# Patient Record
Sex: FEMALE | Race: OTHER | NOT HISPANIC OR LATINO | URBAN - METROPOLITAN AREA
[De-identification: names, ages, dates, MRNs, and addresses within clinical notes are randomized per-mention and may not be internally consistent; named-entity substitution may affect disease eponyms.]

---

## 2023-11-17 DIAGNOSIS — E03.9 HYPOTHYROIDISM, UNSPECIFIED: ICD-10-CM

## 2023-11-17 RX ORDER — LEVOTHYROXINE SODIUM 50 UG/1
50 TABLET ORAL
Qty: 30 TABLET | Refills: 0 | Status: SHIPPED | OUTPATIENT
Start: 2023-11-17 | End: 2023-12-21

## 2023-12-21 DIAGNOSIS — E03.9 HYPOTHYROIDISM, UNSPECIFIED: ICD-10-CM

## 2023-12-21 RX ORDER — LEVOTHYROXINE SODIUM 50 UG/1
50 TABLET ORAL
Qty: 90 TABLET | Refills: 0 | Status: SHIPPED | OUTPATIENT
Start: 2023-12-21 | End: 2024-04-24

## 2024-01-24 NOTE — PROGRESS NOTES
"This is a 23 year old female for ROUTINE MEDICAL and has Hx MIGRAINES a bit frequent recently but not severe regular pattern; with SCINTILLATING SCOTOMA and NAUSEA.    TRIGGERS are reviewed and minimal ETOH and will DC  SLEEP CYCLE  STRESS  CHOCOLATES    NOT ON OC or other hormones and will avoid due to MIGRAINES   NOT YET SEXUALLY ACTIVE      ROS is NEG for HEADACHE, NAUSEA, VOMITING, DIARRHEA, CHEST PAIN, SOB, and BLEEDING and as further REVIEWED BELOW.    Subjective   Neymar Armendariz is a 23 y.o. female who presents for ROUTINE MEDICAL and EVAL of MOR FREQ MIGRAINES since LATE 2023 COVID EPISODE   .    HPI:    See above    Review of systems is essentially negative for all systems except for any identified issues in HPI above.    Objective     /70   Pulse 70   Temp 36.3 °C (97.4 °F)   Ht 1.676 m (5' 6\")   Wt 84.8 kg (187 lb)   SpO2 100%   BMI 30.18 kg/m²      COMPLETE PHYSICAL EXAM    GENERAL           General Appearance: pleasant, well-appearing, well-developed, well-hydrated, well-nourished, well-groomed, .        HEENT           NECK supple, Neg for adneopathy no thyroid enlargement or nodules, Oropharynx normal no exudates.        EYES           Pupils: PERRLA, no photophobia.        HEART           Rate and Rhythm regular rate and rhythm. Heart sounds: normal S1S2. Murmurs: none.        LUNGS           Effort: Normal chest wall, no pectus, Normal air entry all fields, Clear to IPPA, RR<16 with no use of accessory muscles.        BREASTS           Bilaterally: no masses, no nipple retraction, no nipple discharge, no abnormal skin changes. Axilla: no lymphadenopathy.        BACK           General: unremarkable, no spinal tenderness or rashes.        ABDOMEN           General: Normal to inspection, neg for LKKS or masses and BSs heard in all quadrants               LYMPHATICS           Cervical: none. Axillary: none.        MUSCULOSKELETAL           gross abnormalities no gross abnormalities, no " joint redness or swelling.        EXTREMITIES           Varicose veins: not present. Pulses: 2+ bilateral. Clubbing: none. Cyanosis: no.        NEUROLOGICAL           Orientation: alert and oriented x 3. Grossly normal: yes. Plantars: downgoing bilaterally. Muscle Bulk: normal . Cranial Nerves: CN's II-XII grossly intact.        PSYCHOLOGY           Affect: appropriate. Mood: pleasant.     Assessment/Plan   Problem List Items Addressed This Visit    None  Visit Diagnoses       Routine medical exam    -  Primary    Immunization counseling                FOLLOW UP:   YEARLY for ROUTINE MEDICAL and PRN for other issues as discussed in visit         Vita Martinez M.D.

## 2024-02-06 ENCOUNTER — LAB (OUTPATIENT)
Dept: LAB | Facility: LAB | Age: 24
End: 2024-02-06
Payer: COMMERCIAL

## 2024-02-06 ENCOUNTER — OFFICE VISIT (OUTPATIENT)
Dept: PRIMARY CARE | Facility: CLINIC | Age: 24
End: 2024-02-06
Payer: COMMERCIAL

## 2024-02-06 VITALS
TEMPERATURE: 97.4 F | SYSTOLIC BLOOD PRESSURE: 110 MMHG | BODY MASS INDEX: 30.05 KG/M2 | WEIGHT: 187 LBS | HEART RATE: 70 BPM | OXYGEN SATURATION: 100 % | HEIGHT: 66 IN | DIASTOLIC BLOOD PRESSURE: 70 MMHG

## 2024-02-06 DIAGNOSIS — Z71.85 IMMUNIZATION COUNSELING: ICD-10-CM

## 2024-02-06 DIAGNOSIS — E03.9 HYPOTHYROIDISM, UNSPECIFIED TYPE: ICD-10-CM

## 2024-02-06 DIAGNOSIS — G43.109 MIGRAINE WITH AURA AND WITHOUT STATUS MIGRAINOSUS, NOT INTRACTABLE: ICD-10-CM

## 2024-02-06 DIAGNOSIS — Z00.00 ROUTINE MEDICAL EXAM: Primary | ICD-10-CM

## 2024-02-06 DIAGNOSIS — J45.909 UNCOMPLICATED ASTHMA, UNSPECIFIED ASTHMA SEVERITY, UNSPECIFIED WHETHER PERSISTENT (HHS-HCC): ICD-10-CM

## 2024-02-06 DIAGNOSIS — Z00.00 ROUTINE MEDICAL EXAM: ICD-10-CM

## 2024-02-06 DIAGNOSIS — Z76.0 MEDICATION REFILL: ICD-10-CM

## 2024-02-06 LAB
ALBUMIN SERPL-MCNC: 4.4 G/DL (ref 3.5–5)
ALP BLD-CCNC: 81 U/L (ref 35–125)
ALT SERPL-CCNC: 11 U/L (ref 5–40)
ANION GAP SERPL CALC-SCNC: 11 MMOL/L
AST SERPL-CCNC: 17 U/L (ref 5–40)
BACTERIA #/AREA URNS AUTO: ABNORMAL /HPF
BASOPHILS # BLD AUTO: 0.06 X10*3/UL (ref 0–0.1)
BASOPHILS NFR BLD AUTO: 0.7 %
BILIRUB SERPL-MCNC: 0.3 MG/DL (ref 0.1–1.2)
BUN SERPL-MCNC: 7 MG/DL (ref 8–25)
CALCIUM SERPL-MCNC: 9.2 MG/DL (ref 8.5–10.4)
CHLORIDE SERPL-SCNC: 104 MMOL/L (ref 97–107)
CO2 SERPL-SCNC: 25 MMOL/L (ref 24–31)
CREAT SERPL-MCNC: 0.8 MG/DL (ref 0.4–1.6)
EGFRCR SERPLBLD CKD-EPI 2021: >90 ML/MIN/1.73M*2
EOSINOPHIL # BLD AUTO: 0.11 X10*3/UL (ref 0–0.7)
EOSINOPHIL NFR BLD AUTO: 1.4 %
ERYTHROCYTE [DISTWIDTH] IN BLOOD BY AUTOMATED COUNT: 12.2 % (ref 11.5–14.5)
GLUCOSE SERPL-MCNC: 84 MG/DL (ref 65–99)
HCT VFR BLD AUTO: 42.1 % (ref 36–46)
HGB BLD-MCNC: 14.4 G/DL (ref 12–16)
IMM GRANULOCYTES # BLD AUTO: 0.03 X10*3/UL (ref 0–0.7)
IMM GRANULOCYTES NFR BLD AUTO: 0.4 % (ref 0–0.9)
LYMPHOCYTES # BLD AUTO: 1.87 X10*3/UL (ref 1.2–4.8)
LYMPHOCYTES NFR BLD AUTO: 23.2 %
MCH RBC QN AUTO: 30.4 PG (ref 26–34)
MCHC RBC AUTO-ENTMCNC: 34.2 G/DL (ref 32–36)
MCV RBC AUTO: 89 FL (ref 80–100)
MONOCYTES # BLD AUTO: 0.57 X10*3/UL (ref 0.1–1)
MONOCYTES NFR BLD AUTO: 7.1 %
MUCOUS THREADS #/AREA URNS AUTO: ABNORMAL /LPF
NEUTROPHILS # BLD AUTO: 5.42 X10*3/UL (ref 1.2–7.7)
NEUTROPHILS NFR BLD AUTO: 67.2 %
NRBC BLD-RTO: 0 /100 WBCS (ref 0–0)
PLATELET # BLD AUTO: 318 X10*3/UL (ref 150–450)
POTASSIUM SERPL-SCNC: 4.3 MMOL/L (ref 3.4–5.1)
PROT SERPL-MCNC: 7.1 G/DL (ref 5.9–7.9)
RBC # BLD AUTO: 4.74 X10*6/UL (ref 4–5.2)
RBC #/AREA URNS AUTO: ABNORMAL /HPF
SODIUM SERPL-SCNC: 140 MMOL/L (ref 133–145)
SQUAMOUS #/AREA URNS AUTO: ABNORMAL /HPF
TSH SERPL DL<=0.05 MIU/L-ACNC: 1.56 MIU/L (ref 0.27–4.2)
WBC # BLD AUTO: 8.1 X10*3/UL (ref 4.4–11.3)
WBC #/AREA URNS AUTO: ABNORMAL /HPF

## 2024-02-06 PROCEDURE — 1036F TOBACCO NON-USER: CPT | Performed by: FAMILY MEDICINE

## 2024-02-06 PROCEDURE — 85025 COMPLETE CBC W/AUTO DIFF WBC: CPT

## 2024-02-06 PROCEDURE — 80053 COMPREHEN METABOLIC PANEL: CPT

## 2024-02-06 PROCEDURE — 36415 COLL VENOUS BLD VENIPUNCTURE: CPT

## 2024-02-06 PROCEDURE — 81001 URINALYSIS AUTO W/SCOPE: CPT

## 2024-02-06 PROCEDURE — 84443 ASSAY THYROID STIM HORMONE: CPT

## 2024-02-06 PROCEDURE — 99395 PREV VISIT EST AGE 18-39: CPT | Performed by: FAMILY MEDICINE

## 2024-02-06 RX ORDER — ALBUTEROL SULFATE 90 UG/1
2 AEROSOL, METERED RESPIRATORY (INHALATION) EVERY 4 HOURS PRN
Qty: 18 G | Refills: 11 | Status: SHIPPED | OUTPATIENT
Start: 2024-02-06 | End: 2024-03-07

## 2024-02-06 RX ORDER — ALBUTEROL SULFATE 90 UG/1
AEROSOL, METERED RESPIRATORY (INHALATION)
COMMUNITY
Start: 2022-08-09 | End: 2024-02-06 | Stop reason: SDUPTHER

## 2024-02-06 RX ORDER — SUMATRIPTAN 50 MG/1
50 TABLET, FILM COATED ORAL ONCE AS NEEDED
Qty: 27 TABLET | Refills: 3 | Status: SHIPPED | OUTPATIENT
Start: 2024-02-06 | End: 2025-02-05

## 2024-02-06 RX ORDER — FLUTICASONE PROPIONATE AND SALMETEROL 500; 50 UG/1; UG/1
POWDER RESPIRATORY (INHALATION)
COMMUNITY
Start: 2022-08-09 | End: 2024-02-06 | Stop reason: SDUPTHER

## 2024-02-06 RX ORDER — FLUTICASONE PROPIONATE AND SALMETEROL 500; 50 UG/1; UG/1
1 POWDER RESPIRATORY (INHALATION)
Qty: 1 EACH | Refills: 11 | Status: SHIPPED | OUTPATIENT
Start: 2024-02-06 | End: 2024-03-07

## 2024-02-06 ASSESSMENT — PAIN SCALES - GENERAL: PAINLEVEL: 0-NO PAIN

## 2024-02-06 ASSESSMENT — PATIENT HEALTH QUESTIONNAIRE - PHQ9
2. FEELING DOWN, DEPRESSED OR HOPELESS: NOT AT ALL
1. LITTLE INTEREST OR PLEASURE IN DOING THINGS: NOT AT ALL
SUM OF ALL RESPONSES TO PHQ9 QUESTIONS 1 AND 2: 0

## 2024-04-24 DIAGNOSIS — E03.9 HYPOTHYROIDISM, UNSPECIFIED: ICD-10-CM

## 2024-04-24 RX ORDER — LEVOTHYROXINE SODIUM 50 UG/1
50 TABLET ORAL
Qty: 90 TABLET | Refills: 3 | Status: SHIPPED | OUTPATIENT
Start: 2024-04-24 | End: 2025-04-19

## 2024-04-24 NOTE — TELEPHONE ENCOUNTER
Recent Visits  Date Type Provider Dept   02/06/24 Office Visit Vita Amaya MD [place] [id number]   Showing recent visits within past 365 days and meeting all other requirements  Future Appointments  No visits were found meeting these conditions.  Showing future appointments within next 90 days and meeting all other requirements    Last ov 2/6/24

## 2025-02-13 ENCOUNTER — OFFICE VISIT (OUTPATIENT)
Dept: URGENT CARE | Age: 25
End: 2025-02-13
Payer: COMMERCIAL

## 2025-02-13 VITALS
BODY MASS INDEX: 27.32 KG/M2 | DIASTOLIC BLOOD PRESSURE: 96 MMHG | OXYGEN SATURATION: 99 % | HEIGHT: 66 IN | RESPIRATION RATE: 18 BRPM | SYSTOLIC BLOOD PRESSURE: 136 MMHG | WEIGHT: 170 LBS | HEART RATE: 100 BPM | TEMPERATURE: 98.4 F

## 2025-02-13 DIAGNOSIS — J02.9 SORE THROAT: ICD-10-CM

## 2025-02-13 DIAGNOSIS — R05.1 ACUTE COUGH: ICD-10-CM

## 2025-02-13 DIAGNOSIS — J06.9 VIRAL UPPER RESPIRATORY TRACT INFECTION: Primary | ICD-10-CM

## 2025-02-13 LAB
POC BINAX EXPIRATION: NORMAL
POC BINAX NOW COVID SERIAL NUMBER: NORMAL
POC RAPID INFLUENZA A: NEGATIVE
POC RAPID INFLUENZA B: NEGATIVE
POC RAPID STREP: NEGATIVE
POC SARS-COV-2 AG BINAX: NORMAL

## 2025-02-13 PROCEDURE — 3008F BODY MASS INDEX DOCD: CPT | Performed by: STUDENT IN AN ORGANIZED HEALTH CARE EDUCATION/TRAINING PROGRAM

## 2025-02-13 PROCEDURE — 87811 SARS-COV-2 COVID19 W/OPTIC: CPT | Performed by: STUDENT IN AN ORGANIZED HEALTH CARE EDUCATION/TRAINING PROGRAM

## 2025-02-13 PROCEDURE — 1036F TOBACCO NON-USER: CPT | Performed by: STUDENT IN AN ORGANIZED HEALTH CARE EDUCATION/TRAINING PROGRAM

## 2025-02-13 PROCEDURE — 99213 OFFICE O/P EST LOW 20 MIN: CPT | Performed by: STUDENT IN AN ORGANIZED HEALTH CARE EDUCATION/TRAINING PROGRAM

## 2025-02-13 PROCEDURE — 87880 STREP A ASSAY W/OPTIC: CPT | Performed by: STUDENT IN AN ORGANIZED HEALTH CARE EDUCATION/TRAINING PROGRAM

## 2025-02-13 PROCEDURE — 87804 INFLUENZA ASSAY W/OPTIC: CPT | Performed by: STUDENT IN AN ORGANIZED HEALTH CARE EDUCATION/TRAINING PROGRAM

## 2025-02-13 RX ORDER — FLUTICASONE PROPIONATE 50 MCG
1 SPRAY, SUSPENSION (ML) NASAL DAILY
Qty: 16 G | Refills: 0 | Status: SHIPPED | OUTPATIENT
Start: 2025-02-13 | End: 2025-02-27

## 2025-02-13 RX ORDER — BENZONATATE 200 MG/1
200 CAPSULE ORAL 3 TIMES DAILY PRN
Qty: 30 CAPSULE | Refills: 0 | Status: SHIPPED | OUTPATIENT
Start: 2025-02-13 | End: 2025-02-20

## 2025-02-13 RX ORDER — ALBUTEROL SULFATE 90 UG/1
2 INHALANT RESPIRATORY (INHALATION) EVERY 6 HOURS PRN
Qty: 18 G | Refills: 0 | Status: SHIPPED | OUTPATIENT
Start: 2025-02-13 | End: 2025-03-10

## 2025-02-13 ASSESSMENT — PAIN SCALES - GENERAL: PAINLEVEL_OUTOF10: 0-NO PAIN

## 2025-02-13 NOTE — LETTER
February 13, 2025     Patient: Neymar Armendariz   YOB: 2000   Date of Visit: 2/13/2025       To Whom It May Concern:    It is my medical opinion that Neymar Armendariz may return to work on 02/17/2025 .    If you have any questions or concerns, please don't hesitate to call.         Sincerely,        Gregory Haynes DO    CC: No Recipients

## 2025-02-13 NOTE — PROGRESS NOTES
"Subjective   Patient ID: Neymar Armendariz is a 24 y.o. female. They present today with a chief complaint of Cough (Coughing sore throat fever runny nose and vomiting since monday).    History of Present Illness  Neymar is a pleasant 24-year-old female who presents accompanied by parent for evaluation of cough, sore throat and chills since Monday, 2/10/2025.  Patient denies chest pain or shortness of breath.  Patient notes feeling nauseous and not exactly vomiting yesterday after having a cracker and no vomiting episodes since.  Patient denies severe abdominal pain or diarrhea.  Patient notes chills with cough that is worse at night.  Patient's main concern is a cough that is minimally productive of phlegm sputum.  Patient also reports some sinus congestion and postnasal drip.  Patient is seeking a work excuse.  Patient has been utilizing DuoNebs at night with some improvement of symptoms.  No other symptoms or concerns otherwise reported.    Past Medical History  Allergies as of 02/13/2025    (No Known Allergies)       (Not in a hospital admission)       Past Medical History:   Diagnosis Date    Asthma        Past Surgical History:   Procedure Laterality Date    TONSILLECTOMY          reports that she has never smoked. She has never used smokeless tobacco. She reports current alcohol use.    Review of Systems  A 10-point review of systems was performed, otherwise unremarkable unless stated in the history of present illness.                Objective    Vitals:    02/13/25 1713   BP: (!) 136/96   Pulse: 100   Resp: 18   Temp: 36.9 °C (98.4 °F)   TempSrc: Oral   SpO2: 99%   Weight: 77.1 kg (170 lb)   Height: 1.676 m (5' 6\")     No LMP recorded.    Gen: Vitals noted and reviewed, no evidence of acute distress, well developed and afebrile.   Psych: Mood and affect appropriate for setting.  Head/Face: Atraumatic and normocephalic.   Neuro: No focal deficits noted.  ENT: TMs clear bilaterally, EACs unremarkable. Mastoids " non-tender. Posterior oropharynx with erythema, without exudate, or swelling. Uvula is in the midline and non-edematous.   Neck: Supple. No meningismus through full range of motion. No lymphadenopathy.   Cardiac: Regular rate and rhythm no murmur.   Lungs: Clear to auscultation throughout, No evidence of wheezing, rhonchi or crackles. Good aeration throughout.   Abdomen: Soft non-tender throughout. Normoactive bowel sounds. No evidence of palpable masses    Extremities: Symmetrical, No peripheral edema  Skin: Without evidence of ecchymosis, wounds, or rashes.      Point of Care Test & Imaging Results from this visit  Results for orders placed or performed in visit on 02/13/25   POCT Covid-19 Rapid Antigen   Result Value Ref Range    Binax NOW Covid Serial Number -     BINAX NOW Covid Expiration -     POC MODESTA-COV-2 AG  Presumptive negative test for SARS-CoV-2 (no antigen detected)     Presumptive negative test for SARS-CoV-2 (no antigen detected)   POCT Influenza A/B manually resulted   Result Value Ref Range    POC Rapid Influenza A Negative Negative    POC Rapid Influenza B Negative Negative   POCT rapid strep A manually resulted   Result Value Ref Range    POC Rapid Strep Negative Negative      No results found.    Diagnostic study results (if any) were reviewed by Gregory Haynes DO.    Assessment/Plan   Allergies, medications, history, and pertinent labs/EKGs/Imaging reviewed by Gregory Haynes DO.     Medical Decision Making  Discussed with the patient symptoms and clinical presentation findings suggestive of an acute viral upper respiratory illness with cough of unclear etiology.  We advise close symptom monitoring supportive treatment use of over-the-counter remedies for added symptom relief.  We agreed to prescribe Tessalon Perles along with fluticasone nasal spray to aid in symptom management.  We advised continued use of home remedies and already prescribed DuoNeb nebulizer solution. Follow up with PCP. We  advised seeking immediate emergency medical attention if symptoms fail to improve, worsen or any concerning symptoms arise. Patient voiced full understanding and agreement to plan.      Orders and Diagnoses  Diagnoses and all orders for this visit:  Viral upper respiratory tract infection  -     benzonatate (Tessalon) 200 mg capsule; Take 1 capsule (200 mg) by mouth 3 times a day as needed for cough for up to 7 days. Do not crush or chew.  -     fluticasone (Flonase) 50 mcg/actuation nasal spray; Administer 1 spray into each nostril once daily for 14 days. Shake gently. Before first use, prime pump. After use, clean tip and replace cap. As needed for sinus congestion  -     albuterol 90 mcg/actuation inhaler; Inhale 2 puffs every 6 hours if needed for wheezing for up to 25 days.  Sore throat  -     POCT Covid-19 Rapid Antigen  -     POCT Influenza A/B manually resulted  -     POCT rapid strep A manually resulted  Acute cough  -     benzonatate (Tessalon) 200 mg capsule; Take 1 capsule (200 mg) by mouth 3 times a day as needed for cough for up to 7 days. Do not crush or chew.  -     fluticasone (Flonase) 50 mcg/actuation nasal spray; Administer 1 spray into each nostril once daily for 14 days. Shake gently. Before first use, prime pump. After use, clean tip and replace cap. As needed for sinus congestion  -     albuterol 90 mcg/actuation inhaler; Inhale 2 puffs every 6 hours if needed for wheezing for up to 25 days.      Medical Admin Record      Patient disposition: Home    Electronically signed by Gregory Haynes DO  5:59 PM

## 2025-07-07 DIAGNOSIS — E03.9 HYPOTHYROIDISM, UNSPECIFIED: Primary | ICD-10-CM

## 2025-07-07 RX ORDER — LEVOTHYROXINE SODIUM 50 UG/1
50 TABLET ORAL
Qty: 90 TABLET | Refills: 3 | OUTPATIENT
Start: 2025-07-07 | End: 2026-07-02

## 2025-07-07 NOTE — TELEPHONE ENCOUNTER
LVM for patient to call the office for scheduling and insurance update. Synthroid has been populated. LOV 02/06/2024.

## 2025-07-07 NOTE — TELEPHONE ENCOUNTER
"Pt LVM stating she \"need my synthroid refilled and sent to [facility] pharmacy in [place] on [address]\"    Pt also states she has new insurance she needs to update it.     Pt is Martinez pt and hasn't been seen since 2/6/24    Please call pt and advise she needs an appointment for refills.   "

## 2025-07-08 NOTE — TELEPHONE ENCOUNTER
LVM informing the patient that the Rx refill need will be addressed during her office visit with Raul Newell PA-C on 7/9/2025.

## 2025-07-09 ENCOUNTER — OFFICE VISIT (OUTPATIENT)
Dept: PRIMARY CARE | Facility: CLINIC | Age: 25
End: 2025-07-09
Payer: COMMERCIAL

## 2025-07-09 VITALS
TEMPERATURE: 97.8 F | BODY MASS INDEX: 28.6 KG/M2 | SYSTOLIC BLOOD PRESSURE: 98 MMHG | DIASTOLIC BLOOD PRESSURE: 78 MMHG | HEART RATE: 57 BPM | WEIGHT: 182.2 LBS | HEIGHT: 67 IN | OXYGEN SATURATION: 99 %

## 2025-07-09 DIAGNOSIS — Z00.00 ANNUAL PHYSICAL EXAM: ICD-10-CM

## 2025-07-09 DIAGNOSIS — Z11.4 SCREENING FOR HUMAN IMMUNODEFICIENCY VIRUS: ICD-10-CM

## 2025-07-09 DIAGNOSIS — J45.909 UNCOMPLICATED ASTHMA, UNSPECIFIED ASTHMA SEVERITY, UNSPECIFIED WHETHER PERSISTENT (HHS-HCC): Primary | ICD-10-CM

## 2025-07-09 DIAGNOSIS — Z11.59 NEED FOR HEPATITIS C SCREENING TEST: ICD-10-CM

## 2025-07-09 DIAGNOSIS — E03.9 HYPOTHYROIDISM, UNSPECIFIED: ICD-10-CM

## 2025-07-09 DIAGNOSIS — E03.9 HYPOTHYROIDISM, UNSPECIFIED TYPE: ICD-10-CM

## 2025-07-09 DIAGNOSIS — Z13.220 SCREENING FOR LIPID DISORDERS: ICD-10-CM

## 2025-07-09 DIAGNOSIS — Z13.1 SCREENING FOR DIABETES MELLITUS: ICD-10-CM

## 2025-07-09 DIAGNOSIS — G43.109 MIGRAINE WITH AURA AND WITHOUT STATUS MIGRAINOSUS, NOT INTRACTABLE: ICD-10-CM

## 2025-07-09 PROCEDURE — 99214 OFFICE O/P EST MOD 30 MIN: CPT | Performed by: PHYSICIAN ASSISTANT

## 2025-07-09 PROCEDURE — 1036F TOBACCO NON-USER: CPT | Performed by: PHYSICIAN ASSISTANT

## 2025-07-09 PROCEDURE — 3008F BODY MASS INDEX DOCD: CPT | Performed by: PHYSICIAN ASSISTANT

## 2025-07-09 PROCEDURE — 99395 PREV VISIT EST AGE 18-39: CPT | Performed by: PHYSICIAN ASSISTANT

## 2025-07-09 RX ORDER — ALBUTEROL SULFATE 90 UG/1
2 INHALANT RESPIRATORY (INHALATION) EVERY 6 HOURS PRN
Qty: 18 G | Refills: 0 | Status: SHIPPED | OUTPATIENT
Start: 2025-07-09 | End: 2025-08-03

## 2025-07-09 RX ORDER — FLUTICASONE PROPIONATE 50 MCG
1 SPRAY, SUSPENSION (ML) NASAL DAILY
Qty: 16 G | Refills: 0 | Status: SHIPPED | OUTPATIENT
Start: 2025-07-09 | End: 2025-07-23

## 2025-07-09 RX ORDER — SUMATRIPTAN SUCCINATE 50 MG/1
50 TABLET ORAL ONCE AS NEEDED
Qty: 27 TABLET | Refills: 3 | Status: SHIPPED | OUTPATIENT
Start: 2025-07-09 | End: 2026-07-09

## 2025-07-09 RX ORDER — LEVOTHYROXINE SODIUM 50 UG/1
50 TABLET ORAL
Qty: 90 TABLET | Refills: 3 | Status: SHIPPED | OUTPATIENT
Start: 2025-07-09 | End: 2026-07-04

## 2025-07-09 ASSESSMENT — ENCOUNTER SYMPTOMS
ALLERGIC/IMMUNOLOGIC NEGATIVE: 1
CONSTITUTIONAL NEGATIVE: 1
HEMATOLOGIC/LYMPHATIC NEGATIVE: 1
ENDOCRINE NEGATIVE: 1
PSYCHIATRIC NEGATIVE: 1
CARDIOVASCULAR NEGATIVE: 1
MUSCULOSKELETAL NEGATIVE: 1
EYES NEGATIVE: 1
RESPIRATORY NEGATIVE: 1
GASTROINTESTINAL NEGATIVE: 1
NEUROLOGICAL NEGATIVE: 1

## 2025-07-09 ASSESSMENT — PATIENT HEALTH QUESTIONNAIRE - PHQ9
SUM OF ALL RESPONSES TO PHQ9 QUESTIONS 1 AND 2: 0
1. LITTLE INTEREST OR PLEASURE IN DOING THINGS: NOT AT ALL
2. FEELING DOWN, DEPRESSED OR HOPELESS: NOT AT ALL

## 2025-07-09 ASSESSMENT — PAIN SCALES - GENERAL: PAINLEVEL_OUTOF10: 0-NO PAIN

## 2025-07-09 NOTE — PATIENT INSTRUCTIONS
Schedule with GYN for cervical cancer screening  Obtain fasting bloodwork  Let me know if you need a TDAP (once every 10 years)  Return to clinic in 1 year    If symptoms severely worsen or you experience any new concerning symptoms, go to the nearest emergency room or call 911 as needed.

## 2025-07-09 NOTE — PROGRESS NOTES
"Subjective     Patient ID: Neymar Armendariz is a 24 y.o. female who presents for Establish Care and Annual Exam.    HPI  Neymar Armendariz is seen for her chronic issues.      Patient is new to me today.  Patient wishes to establish care    Asthma  -Frequently uses asthma rescue inhaler, has not needed oral steroids.  Will try Airsupra    Hypothyroidism  -Takes levothyroxine 50 mcg/day, check TSH, refill medication    Migraine  -Refill sumatriptan    Annual physical  -Screening labs  -OB/GYN referral for cervical cancer  She will let us know if she needs a tetanus shot or not, she went to [facility] 2 years and half years ago and later got the vaccine at that time.    Review of Systems   Constitutional: Negative.    HENT: Negative.     Eyes: Negative.    Respiratory: Negative.     Cardiovascular: Negative.    Gastrointestinal: Negative.    Endocrine: Negative.    Genitourinary: Negative.    Musculoskeletal: Negative.    Skin: Negative.    Allergic/Immunologic: Negative.    Neurological: Negative.    Hematological: Negative.    Psychiatric/Behavioral: Negative.         Objective  Vitals:  BP 98/78 (BP Location: Left arm, Patient Position: Sitting, BP Cuff Size: Adult)   Pulse 57   Temp 36.6 °C (97.8 °F)   Ht 1.695 m (5' 6.73\")   Wt 82.6 kg (182 lb 3.2 oz)   LMP 06/19/2025 (Approximate)   SpO2 99%   BMI 28.77 kg/m²     Physical Exam  Vitals and nursing note reviewed.   Constitutional:       General: She is not in acute distress.     Appearance: Normal appearance. She is normal weight. She is not ill-appearing, toxic-appearing or diaphoretic.   HENT:      Head: Normocephalic and atraumatic.      Right Ear: Tympanic membrane, ear canal and external ear normal. There is no impacted cerumen.      Left Ear: Tympanic membrane, ear canal and external ear normal. There is no impacted cerumen.      Nose: Nose normal. No congestion.      Mouth/Throat:      Mouth: Mucous membranes are moist.      Pharynx: No oropharyngeal " exudate or posterior oropharyngeal erythema.   Eyes:      General:         Right eye: No discharge.         Left eye: No discharge.      Extraocular Movements: Extraocular movements intact.      Conjunctiva/sclera: Conjunctivae normal.      Pupils: Pupils are equal, round, and reactive to light.   Neck:      Vascular: No carotid bruit.   Cardiovascular:      Rate and Rhythm: Normal rate and regular rhythm.      Pulses: Normal pulses.      Heart sounds: Normal heart sounds. No murmur heard.     No friction rub. No gallop.   Pulmonary:      Effort: Pulmonary effort is normal. No respiratory distress.      Breath sounds: No stridor. No wheezing, rhonchi or rales.   Chest:      Chest wall: No tenderness.   Abdominal:      General: Bowel sounds are normal. There is no distension.      Palpations: Abdomen is soft. There is no mass.      Tenderness: There is no abdominal tenderness. There is no right CVA tenderness, left CVA tenderness, guarding or rebound.      Hernia: No hernia is present.   Musculoskeletal:         General: No swelling, tenderness, deformity or signs of injury. Normal range of motion.      Cervical back: Normal range of motion. No rigidity or tenderness.      Right lower leg: No edema.      Left lower leg: No edema.   Lymphadenopathy:      Cervical: No cervical adenopathy.   Skin:     General: Skin is warm.      Capillary Refill: Capillary refill takes less than 2 seconds.      Coloration: Skin is not jaundiced or pale.      Findings: No bruising, erythema, lesion or rash.   Neurological:      General: No focal deficit present.      Mental Status: She is alert and oriented to person, place, and time.      Cranial Nerves: No cranial nerve deficit.      Sensory: No sensory deficit.      Motor: No weakness.      Coordination: Coordination normal.      Gait: Gait normal.      Deep Tendon Reflexes: Reflexes normal.   Psychiatric:         Mood and Affect: Mood normal.         Behavior: Behavior normal.         " Thought Content: Thought content normal.         Judgment: Judgment normal.       CBC:   Lab Results   Component Value Date    WBC 8.1 02/06/2024    RBC 4.74 02/06/2024    HGB 14.4 02/06/2024    HCT 42.1 02/06/2024    MCV 89 02/06/2024    MCH 30.4 02/06/2024    MCHC 34.2 02/06/2024    RDWS 39.7 01/13/2020    RDWC 11.9 01/13/2020     02/06/2024    MPV 10.8 01/13/2020       CBC w/Diff:   Lab Results   Component Value Date    WBC 8.1 02/06/2024    NRBC 0.0 02/06/2024    RBC 4.74 02/06/2024    HGB 14.4 02/06/2024    HCT 42.1 02/06/2024    MCV 89 02/06/2024    MCHC 34.2 02/06/2024     02/06/2024    RDW 12.2 02/06/2024    NEUTOPHILPCT 67.2 02/06/2024    IGPCT 0.4 02/06/2024    LYMPHOPCT 23.2 02/06/2024    MONOPCT 7.1 02/06/2024    EOSPCT 1.4 02/06/2024    BASOPCT 0.7 02/06/2024    NEUTROABS 5.42 02/06/2024    LYMPHSABS 1.87 02/06/2024    MONOSABS 0.57 02/06/2024    EOSABS 0.11 02/06/2024    BASOSABS 0.06 02/06/2024        CMP:  Lab Results   Component Value Date    GLUCOSE 84 02/06/2024     02/06/2024    K 4.3 02/06/2024     02/06/2024    CO2 25 02/06/2024    ANIONGAP 11 02/06/2024    BUN 7 (L) 02/06/2024    CREATININE 0.80 02/06/2024    CALCIUM 9.2 02/06/2024    ALBUMIN 4.4 02/06/2024    ALKPHOS 81 02/06/2024    PROT 7.1 02/06/2024    AST 17 02/06/2024    BILITOT 0.3 02/06/2024    ALT 11 02/06/2024        BMP:  Lab Results   Component Value Date    GLUCOSE 84 02/06/2024    BUN 7 (L) 02/06/2024    CREATININE 0.80 02/06/2024    UREACREAUR 14.3 08/10/2020     02/06/2024    K 4.3 02/06/2024     02/06/2024    CO2 25 02/06/2024    ANIONGAP 11 02/06/2024    CALCIUM 9.2 02/06/2024    EGFR >90 02/06/2024        Lipid:   No results found for: \"CHOL\", \"HDL\", \"CHHDL\", \"LDLCALC\", \"VLDL\", \"TRIG\"    LDL Direct:  No results found for: \"LDLDIRECT\"     TSH:   Lab Results   Component Value Date    TSH 1.56 02/06/2024        B12:  No results found for: \"IGBBJHIW28\"    Vitamin D:  No results found for: " "\"VITD25\"     HgA1c:   No results found for: \"HGBA1C\", \"KYQWDLUS8S\"    PSA:   No results found for: \"PSA\"    FreeT4:  Lab Results   Component Value Date    FREET4 1.4 01/13/2020       T3:   No results found for: \"T3FREE\"              1. Uncomplicated asthma, unspecified asthma severity, unspecified whether persistent (HHS-HCC)  albuterol 90 mcg/actuation inhaler    albuterol-budesonide (Airsupra) 90-80 mcg/actuation inhaler      2. Hypothyroidism, unspecified  levothyroxine (Synthroid, Levoxyl) 50 mcg tablet    Tsh With Reflex To Free T4 If Abnormal    Tsh With Reflex To Free T4 If Abnormal      3. Migraine with aura and without status migrainosus, not intractable  fluticasone (Flonase) 50 mcg/actuation nasal spray    SUMAtriptan (Imitrex) 50 mg tablet      4. Hypothyroidism, unspecified type        5. Screening for human immunodeficiency virus  HIV 1/2 Antigen/Antibody Screen with Reflex to Confirmation    HIV 1/2 Antigen/Antibody Screen with Reflex to Confirmation      6. Need for hepatitis C screening test  Hepatitis C antibody    Hepatitis C antibody      7. Screening for lipid disorders  Lipid panel    Lipid panel      8. Screening for diabetes mellitus  Comprehensive metabolic panel    Comprehensive metabolic panel      9. Annual physical exam  Referral to Gynecology             Orders Placed This Encounter   Procedures    Tsh With Reflex To Free T4 If Abnormal     Standing Status:   Future     Number of Occurrences:   1     Expected Date:   7/9/2025     Expiration Date:   7/9/2026     Release result to [facility]:   Immediate [1]    Lipid panel     Standing Status:   Future     Number of Occurrences:   1     Expected Date:   7/9/2025     Expiration Date:   7/9/2026     Release result to [facility]:   Immediate [1]    Comprehensive metabolic panel     Standing Status:   Future     Number of Occurrences:   1     Expected Date:   7/9/2025     Expiration Date:   7/9/2026     Release result to [facility]:   Immediate [1]    " HIV 1/2 Antigen/Antibody Screen with Reflex to Confirmation     Standing Status:   Future     Number of Occurrences:   1     Expected Date:   7/9/2025     Expiration Date:   7/9/2026    Hepatitis C antibody     Standing Status:   Future     Number of Occurrences:   1     Expected Date:   7/9/2025     Expiration Date:   7/9/2026    Referral to Gynecology     Standing Status:   Future     Expected Date:   7/9/2025     Expiration Date:   7/9/2026     Referral Priority:   Routine     Referral Type:   Consultation     Referral Reason:   Specialty Services Required     Requested Specialty:   Obstetrics and Gynecology     Number of Visits Requested:   1     Schedule with GYN for cervical cancer screening  Obtain fasting bloodwork  Let me know if you need a TDAP (once every 10 years)  Return to clinic in 1 year    If symptoms severely worsen or you experience any new concerning symptoms, go to the nearest emergency room or call 911 as needed.

## 2025-08-04 ENCOUNTER — APPOINTMENT (OUTPATIENT)
Dept: OBSTETRICS AND GYNECOLOGY | Facility: CLINIC | Age: 25
End: 2025-08-04
Payer: COMMERCIAL

## 2025-08-04 VITALS
BODY MASS INDEX: 29.28 KG/M2 | DIASTOLIC BLOOD PRESSURE: 76 MMHG | SYSTOLIC BLOOD PRESSURE: 112 MMHG | HEIGHT: 66 IN | WEIGHT: 182.2 LBS

## 2025-08-04 DIAGNOSIS — G89.29 CHRONIC LOW BACK PAIN WITHOUT SCIATICA, UNSPECIFIED BACK PAIN LATERALITY: ICD-10-CM

## 2025-08-04 DIAGNOSIS — L68.0 HIRSUTISM: ICD-10-CM

## 2025-08-04 DIAGNOSIS — M54.50 CHRONIC LOW BACK PAIN WITHOUT SCIATICA, UNSPECIFIED BACK PAIN LATERALITY: ICD-10-CM

## 2025-08-04 DIAGNOSIS — Z01.419 WELL WOMAN EXAM WITH ROUTINE GYNECOLOGICAL EXAM: Primary | ICD-10-CM

## 2025-08-04 DIAGNOSIS — N94.819 VULVODYNIA: ICD-10-CM

## 2025-08-04 PROCEDURE — 3008F BODY MASS INDEX DOCD: CPT | Performed by: NURSE PRACTITIONER

## 2025-08-04 PROCEDURE — 99385 PREV VISIT NEW AGE 18-39: CPT | Performed by: NURSE PRACTITIONER

## 2025-08-04 PROCEDURE — 1036F TOBACCO NON-USER: CPT | Performed by: NURSE PRACTITIONER

## 2025-08-04 RX ORDER — ESTRADIOL 0.1 MG/G
CREAM VAGINAL
Qty: 42.5 G | Refills: 1 | Status: SHIPPED | OUTPATIENT
Start: 2025-08-04

## 2025-08-04 ASSESSMENT — ENCOUNTER SYMPTOMS
HEMATOLOGIC/LYMPHATIC NEGATIVE: 0
MUSCULOSKELETAL NEGATIVE: 0
ENDOCRINE NEGATIVE: 0
ALLERGIC/IMMUNOLOGIC NEGATIVE: 0
GASTROINTESTINAL NEGATIVE: 0
RESPIRATORY NEGATIVE: 0
PSYCHIATRIC NEGATIVE: 0
CONSTITUTIONAL NEGATIVE: 0
CARDIOVASCULAR NEGATIVE: 0
EYES NEGATIVE: 0
NEUROLOGICAL NEGATIVE: 0

## 2025-08-04 ASSESSMENT — PATIENT HEALTH QUESTIONNAIRE - PHQ9
SUM OF ALL RESPONSES TO PHQ9 QUESTIONS 1 & 2: 0
2. FEELING DOWN, DEPRESSED OR HOPELESS: NOT AT ALL
1. LITTLE INTEREST OR PLEASURE IN DOING THINGS: NOT AT ALL

## 2025-08-04 ASSESSMENT — PAIN SCALES - GENERAL: PAINLEVEL_OUTOF10: 0-NO PAIN

## 2025-08-04 NOTE — LETTER
2025     Raul Newell PA-C  [id number] [facility] [id number]    Patient: Neymar Armendariz   YOB: 2000   Date of Visit: 2025       Dear Dr. Raul Newell PA-C:    Thank you for referring Neymar Armendariz to me for evaluation. Below are my notes for this consultation.  If you have questions, please do not hesitate to call me. I look forward to following your patient along with you.       Sincerely,     Renzo Christianson, APRN-CNP      CC: No Recipients  ______________________________________________________________________________________    Subjective  Patient ID: Neymar Armendariz is a 24 y.o. female who presents for Annual Exam.  HPI  Pt presents for annual exam  Concerns: back pain, intermittent for the last 3-4 years  No precipitating factors  Not coinciding with her cycle  Pain in the lower back, right side  Heating pad is helpful, Alleve    Hirsutism and acne    LMP: regular and monthly  days of bleedin-6  PMS: none  dysmenorrhea: yes, 2-3  day uses tylenol      Are you currently having sex of any kind with anyone? No, has never been   Doesn't prefer tampons because they are uncomfortable  No changes in bowel or bladder    Has never been on contraception    Any plans for a pregnancy in the next year: no    H/O of STI: none  Any concern for exposure to a sexually transmitted infection: none    Vaginal hygiene: soap-body soap  urinary incontinence: none  has she had the HPV vaccine? yes    occupation: works at [employer], customer service  Lives with: roommate  exercise: walking  Calcium intake: unknown   Vitamin D intake: unknown    FH of breast cancer: none  FH of ovarian cancer: none  FH of colon cancer: none  FH of pancreatic cancer:  none  Review of Systems    Objective  Physical Exam  Vitals and nursing note reviewed.   Constitutional:       Appearance: Normal appearance.     Cardiovascular:      Rate and Rhythm: Normal rate and regular rhythm.       Heart sounds: Normal heart sounds.   Pulmonary:      Effort: Pulmonary effort is normal.      Breath sounds: Normal breath sounds.   Abdominal:      Tenderness: There is no abdominal tenderness.   Genitourinary:     Exam position: Lithotomy position.      Labia:         Right: No lesion.         Left: No lesion.       Vagina: Normal.      Cervix: Normal.        Comments: Atrophy of bilateral labia minora  No evidence of a vulvar skin condition   Pain in the above marked areas    Skin:     General: Skin is warm and dry.     Neurological:      Mental Status: She is alert.     Psychiatric:         Attention and Perception: Attention normal.         Mood and Affect: Mood normal.         Speech: Speech normal.         Behavior: Behavior normal.         Thought Content: Thought content normal.         Cognition and Memory: Cognition and memory normal.         Judgment: Judgment normal.         Assessment/Plan  Diagnoses and all orders for this visit:  Well woman exam with routine gynecological exam  Chronic low back pain without sciatica, unspecified back pain laterality  -     Referral to Physical Therapy; Future  Vulvodynia  -     estradiol (Estrace) 0.01 % (0.1 mg/gram) vaginal cream; Discard the applicator and apply a blueberry sized amount to the vaginal opening and just inside the vagina daily for the next 12 weeks  Hirsutism  -     Testosterone,Free and Total; Future  -     Thyroid Stimulating Hormone; Future  -     DHEA-Sulfate; Future  -     17-Hydroxyprogesterone; Future  -     FSH & LH; Future  -     Hemoglobin A1C; Future  Other orders  -     Referral to Gynecology    Pap test deferred d/t pain  I will contact her regarding the labs  Pelvic floor not palpated d/t vestibular pain     CHLOE Milligan-CNP 08/04/25 3:00 PM

## 2025-08-04 NOTE — PROGRESS NOTES
Subjective   Patient ID: Neymar Armendariz is a 24 y.o. female who presents for Annual Exam.  HPI  Pt presents for annual exam  Concerns: back pain, intermittent for the last 3-4 years  No precipitating factors  Not coinciding with her cycle  Pain in the lower back, right side  Heating pad is helpful, Alleve    Hirsutism and acne    LMP: regular and monthly  days of bleedin-6  PMS: none  dysmenorrhea: yes, 2-3  day uses tylenol      Are you currently having sex of any kind with anyone? No, has never been   Doesn't prefer tampons because they are uncomfortable  No changes in bowel or bladder    Has never been on contraception    Any plans for a pregnancy in the next year: no    H/O of STI: none  Any concern for exposure to a sexually transmitted infection: none    Vaginal hygiene: soap-body soap  urinary incontinence: none  has she had the HPV vaccine? yes    occupation: works at [employer], customer service  Lives with: roommate  exercise: walking  Calcium intake: unknown   Vitamin D intake: unknown    FH of breast cancer: none  FH of ovarian cancer: none  FH of colon cancer: none  FH of pancreatic cancer:  none  Review of Systems    Objective   Physical Exam  Vitals and nursing note reviewed.   Constitutional:       Appearance: Normal appearance.     Cardiovascular:      Rate and Rhythm: Normal rate and regular rhythm.      Heart sounds: Normal heart sounds.   Pulmonary:      Effort: Pulmonary effort is normal.      Breath sounds: Normal breath sounds.   Abdominal:      Tenderness: There is no abdominal tenderness.   Genitourinary:     Exam position: Lithotomy position.      Labia:         Right: No lesion.         Left: No lesion.       Vagina: Normal.      Cervix: Normal.        Comments: Atrophy of bilateral labia minora  No evidence of a vulvar skin condition   Pain in the above marked areas    Skin:     General: Skin is warm and dry.     Neurological:      Mental Status: She is alert.      Psychiatric:         Attention and Perception: Attention normal.         Mood and Affect: Mood normal.         Speech: Speech normal.         Behavior: Behavior normal.         Thought Content: Thought content normal.         Cognition and Memory: Cognition and memory normal.         Judgment: Judgment normal.         Assessment/Plan   Diagnoses and all orders for this visit:  Well woman exam with routine gynecological exam  Chronic low back pain without sciatica, unspecified back pain laterality  -     Referral to Physical Therapy; Future  Vulvodynia  -     estradiol (Estrace) 0.01 % (0.1 mg/gram) vaginal cream; Discard the applicator and apply a blueberry sized amount to the vaginal opening and just inside the vagina daily for the next 12 weeks  Hirsutism  -     Testosterone,Free and Total; Future  -     Thyroid Stimulating Hormone; Future  -     DHEA-Sulfate; Future  -     17-Hydroxyprogesterone; Future  -     FSH & LH; Future  -     Hemoglobin A1C; Future  Other orders  -     Referral to Gynecology    Pap test deferred d/t pain  I will contact her regarding the labs  Pelvic floor not palpated d/t vestibular pain     CHLOE Milligan-CNP 08/04/25 3:00 PM

## 2025-08-10 LAB
17OHP SERPL-MCNC: 45 NG/DL
DHEA-S SERPL-MCNC: 271 MCG/DL (ref 14–349)
EST. AVERAGE GLUCOSE BLD GHB EST-MCNC: 103 MG/DL
EST. AVERAGE GLUCOSE BLD GHB EST-SCNC: 5.7 MMOL/L
FSH SERPL-ACNC: 9.5 MIU/ML
HBA1C MFR BLD: 5.2 %
LH SERPL-ACNC: 10.9 MIU/ML
TESTOST FREE SERPL-MCNC: 7.7 PG/ML (ref 0.1–6.4)
TESTOST SERPL-MCNC: 52 NG/DL (ref 2–45)
TSH SERPL-ACNC: 1.53 MIU/L

## 2025-08-11 DIAGNOSIS — E28.2 PCOS (POLYCYSTIC OVARIAN SYNDROME): Primary | ICD-10-CM

## 2025-08-28 ENCOUNTER — APPOINTMENT (OUTPATIENT)
Dept: PHARMACY | Facility: HOSPITAL | Age: 25
End: 2025-08-28
Payer: COMMERCIAL

## 2025-08-28 DIAGNOSIS — E28.2 PCOS (POLYCYSTIC OVARIAN SYNDROME): ICD-10-CM

## 2025-08-28 RX ORDER — DROSPIRENONE AND ETHINYL ESTRADIOL 0.02-3(28)
1 KIT ORAL DAILY
Qty: 28 TABLET | Refills: 12 | Status: CANCELLED | OUTPATIENT
Start: 2025-08-28 | End: 2026-08-28

## 2025-09-06 LAB
25(OH)D3+25(OH)D2 SERPL-MCNC: 16 NG/ML (ref 30–100)
ALBUMIN SERPL-MCNC: 4.5 G/DL (ref 3.6–5.1)
ALP SERPL-CCNC: 60 U/L (ref 31–125)
ALT SERPL-CCNC: 12 U/L (ref 6–29)
ANION GAP SERPL CALCULATED.4IONS-SCNC: 11 MMOL/L (CALC) (ref 7–17)
AST SERPL-CCNC: 24 U/L (ref 10–30)
BILIRUB SERPL-MCNC: 0.6 MG/DL (ref 0.2–1.2)
BUN SERPL-MCNC: 11 MG/DL (ref 7–25)
CALCIUM SERPL-MCNC: 9.6 MG/DL (ref 8.6–10.2)
CHLORIDE SERPL-SCNC: 103 MMOL/L (ref 98–110)
CHOLEST SERPL-MCNC: 194 MG/DL
CHOLEST/HDLC SERPL: 4 (CALC)
CO2 SERPL-SCNC: 23 MMOL/L (ref 20–32)
CREAT SERPL-MCNC: 0.7 MG/DL (ref 0.5–0.96)
EGFRCR SERPLBLD CKD-EPI 2021: 122 ML/MIN/1.73M2
GLUCOSE SERPL-MCNC: 78 MG/DL (ref 65–99)
HDLC SERPL-MCNC: 49 MG/DL
INSULIN SERPL-ACNC: NORMAL U[IU]/ML
LDLC SERPL CALC-MCNC: 128 MG/DL (CALC)
MIS SERPL-MCNC: NORMAL NG/ML
NONHDLC SERPL-MCNC: 145 MG/DL (CALC)
POTASSIUM SERPL-SCNC: 4.5 MMOL/L (ref 3.5–5.3)
PROT SERPL-MCNC: 7.3 G/DL (ref 6.1–8.1)
SODIUM SERPL-SCNC: 137 MMOL/L (ref 135–146)
TRIGL SERPL-MCNC: 74 MG/DL
URATE SERPL-MCNC: 5.3 MG/DL (ref 2.5–7)

## 2025-09-11 ENCOUNTER — APPOINTMENT (OUTPATIENT)
Dept: PHARMACY | Facility: HOSPITAL | Age: 25
End: 2025-09-11
Payer: COMMERCIAL

## 2025-09-24 ENCOUNTER — APPOINTMENT (OUTPATIENT)
Dept: PHARMACY | Facility: HOSPITAL | Age: 25
End: 2025-09-24
Payer: COMMERCIAL